# Patient Record
Sex: MALE | Race: BLACK OR AFRICAN AMERICAN | NOT HISPANIC OR LATINO | Employment: UNEMPLOYED | URBAN - METROPOLITAN AREA
[De-identification: names, ages, dates, MRNs, and addresses within clinical notes are randomized per-mention and may not be internally consistent; named-entity substitution may affect disease eponyms.]

---

## 2022-01-01 ENCOUNTER — HOSPITAL ENCOUNTER (EMERGENCY)
Facility: HOSPITAL | Age: 57
End: 2022-01-25
Attending: EMERGENCY MEDICINE | Admitting: EMERGENCY MEDICINE

## 2022-01-01 VITALS — OXYGEN SATURATION: 91 %

## 2022-01-01 DIAGNOSIS — I46.9 CARDIAC ARREST: Primary | ICD-10-CM

## 2022-01-01 PROCEDURE — 94799 UNLISTED PULMONARY SVC/PX: CPT

## 2022-01-01 PROCEDURE — 99284 EMERGENCY DEPT VISIT MOD MDM: CPT | Performed by: EMERGENCY MEDICINE

## 2022-01-01 PROCEDURE — 25010000002 EPINEPHRINE 1 MG/10ML SOLUTION PREFILLED SYRINGE: Performed by: EMERGENCY MEDICINE

## 2022-01-01 PROCEDURE — 99284 EMERGENCY DEPT VISIT MOD MDM: CPT

## 2022-01-01 PROCEDURE — 25010000002 AMIODARONE PER 30 MG: Performed by: EMERGENCY MEDICINE

## 2022-01-01 PROCEDURE — 92950 HEART/LUNG RESUSCITATION CPR: CPT

## 2022-01-01 RX ORDER — EPINEPHRINE 0.1 MG/ML
SYRINGE (ML) INJECTION
Status: COMPLETED | OUTPATIENT
Start: 2022-01-01 | End: 2022-01-01

## 2022-01-01 RX ORDER — AMIODARONE HYDROCHLORIDE 50 MG/ML
INJECTION, SOLUTION INTRAVENOUS
Status: COMPLETED | OUTPATIENT
Start: 2022-01-01 | End: 2022-01-01

## 2022-01-01 RX ADMIN — AMIODARONE HYDROCHLORIDE 300 MG: 50 INJECTION, SOLUTION INTRAVENOUS at 19:20

## 2022-01-01 RX ADMIN — EPINEPHRINE 1 MG: 0.1 INJECTION INTRACARDIAC; INTRAVENOUS at 19:23

## 2022-01-01 RX ADMIN — EPINEPHRINE 1 MG: 0.1 INJECTION INTRACARDIAC; INTRAVENOUS at 19:16

## 2022-01-01 RX ADMIN — EPINEPHRINE 1 MG: 0.1 INJECTION INTRACARDIAC; INTRAVENOUS at 19:19

## 2022-01-26 NOTE — ED PROVIDER NOTES
Subjective   Arpit Sams is a 57 yo BM who presents via EMS in full arrest.  EMS was called to the local longterm.  Patient had arrested prior to their arrival.  He had been defibrillated 3 times.  On EMS arrival at the scene patient had a weak pulse.  Unfortunately this was brief.  Patient became pulseless.  He was orally intubated.  CPR was started.  Patient given multiple doses of epinephrine in route.  On ER arrival patient had been without continuous pulse for 25 minutes.       History provided by:  EMS personnel      Review of Systems   Unable to perform ROS: Unstable vital signs       Past Medical History:   Diagnosis Date   • Bacteremia    • Disease of thyroid gland    • Gastrostomy tube in place (HCC)    • Hypertension    • Sepsis (HCC)    • Stroke (HCC)        No Known Allergies    No past surgical history on file.    No family history on file.    Social History     Socioeconomic History   • Marital status: Single   Substance and Sexual Activity   • Alcohol use: Defer   • Drug use: Defer           Objective   Physical Exam  Vitals and nursing note reviewed.   Constitutional:       Interventions: He is intubated.      Comments: 56-year-old black male lying in bed.  Patient orally intubated.  CPR ongoing.  Patient appears older than stated age and in poor overall health.  Patient is unresponsive.   HENT:      Head: Normocephalic and atraumatic.      Nose: Nose normal.   Eyes:      Comments: Pupils are fixed and dilated. No response to light.  No corneal reflex.   Cardiovascular:      Comments: No palpable femoral or carotid pulses  Pulmonary:      Effort: He is intubated.      Comments: No respiratory effects present.   Abdominal:      Palpations: Abdomen is soft.   Skin:     General: Skin is dry.   Neurological:      Mental Status: He is unresponsive.         Procedures           ED Course  ED Course as of 01/25/22 1954 Tue Jan 25, 2022 1932 On ER arrival patient was in V. fib.  He was defibrillated  twice.  Given additional rounds of epinephrine and 1 round of amiodarone.  Our resuscitation efforts were unsuccessful.  Patient has had ongoing CPR for 35 minutes without ROSC. I feel further efforts will not be beneficial. Code called. Time of death  [SS]      ED Course User Index  [SS] Crow Steele MD                                                 [facility]    Final diagnoses:   Cardiac arrest (HCC)       ED Disposition  ED Disposition     ED Disposition Condition Comment                No follow-up provider specified.       Medication List      No changes were made to your prescriptions during this visit.          Crow Steele MD  22

## 2022-01-26 NOTE — ED NOTES
Patient arrived via [facility] from [facility] as cardiac arrest with CPR in progress. Patient intubated PTA & placement verified by Dr Steele upon arrival. Patient was found down by [facility] staff with agonal respirations and questionable pulse. CPR initiated by [facility] staff & continued by [facility] en route. EMS reports patient found to be in Vfib and defibrillated x3 PTA. Also received 4 epi & 1 atropine per EMS. Right tibial IO present upon arrival. Patient arrival to ED at 1913 & care transferred to ED staff. Patient transferred to ED cardiac monitor & defibrillator. Respirations via portable ventilator & chest compressions via autopulse continued. See below.  1916 - 1 epinephrine administered  1917 - pulse check; Vfib on monitor; no pulse present; chest compressions resumed  1918 - defibrillation at 200 Joules  1919 - 1 epinephrine administered  1920 - 300mg amiodarone administered  1922 - pulse check; Vfib on monitor; no pulse present; defibrillation at 200 Joules; chest compressions resumed  1923 - 1 epinephrine administered  1924 - pulse check; Vfib on monitor; no pulse present; no spontaneous respirations; resuscitation efforts terminated  1925 - TIME OF DEATH     Pooja Tran RN  01/25/22 1951

## 2022-01-26 NOTE — ED NOTES
Patient released by  & [facility]. IO & ET tube removed. Post-mortem care provided. Patient escorted to [facility] by ED RN & ED Tech.      Pooja Tran RN  01/25/22 2019